# Patient Record
Sex: MALE | Employment: FULL TIME | URBAN - METROPOLITAN AREA
[De-identification: names, ages, dates, MRNs, and addresses within clinical notes are randomized per-mention and may not be internally consistent; named-entity substitution may affect disease eponyms.]

---

## 2018-05-04 ENCOUNTER — HOSPITAL ENCOUNTER (EMERGENCY)
Facility: CLINIC | Age: 68
Discharge: HOME | End: 2018-05-04
Attending: NURSE PRACTITIONER | Admitting: NURSE PRACTITIONER
Payer: OTHER MISCELLANEOUS

## 2018-05-04 VITALS
HEIGHT: 66 IN | DIASTOLIC BLOOD PRESSURE: 66 MMHG | RESPIRATION RATE: 16 BRPM | BODY MASS INDEX: 38.57 KG/M2 | TEMPERATURE: 97.7 F | SYSTOLIC BLOOD PRESSURE: 163 MMHG | OXYGEN SATURATION: 96 % | WEIGHT: 240 LBS

## 2018-05-04 DIAGNOSIS — H57.89 IRRITATION OF RIGHT EYE: ICD-10-CM

## 2018-05-04 DIAGNOSIS — S05.01XA ABRASION OF RIGHT CORNEA, INITIAL ENCOUNTER: ICD-10-CM

## 2018-05-04 PROCEDURE — 99283 EMERGENCY DEPT VISIT LOW MDM: CPT

## 2018-05-04 RX ORDER — MOXIFLOXACIN 5 MG/ML
1 SOLUTION/ DROPS OPHTHALMIC
Qty: 1 BOTTLE | Refills: 0 | Status: SHIPPED | OUTPATIENT
Start: 2018-05-04 | End: 2018-05-11

## 2018-05-04 ASSESSMENT — VISUAL ACUITY
OS: 20/40
OD: 20/50

## 2018-05-04 ASSESSMENT — ENCOUNTER SYMPTOMS
EYE PAIN: 1
EYE ITCHING: 1

## 2018-05-04 NOTE — ED AVS SNAPSHOT
Emergency Department    [address] [facility] [address]    Phone:  [phone]    Fax:  [phone]                                       Dillan Rodriguez   MRN: [id number]    Department:   Emergency Department   Date of Visit:  5/4/2018           Patient Information     Date Of Birth          1950        Your diagnoses for this visit were:     Irritation of right eye     Abrasion of right cornea, initial encounter        You were seen by Marisol Landrum APRN CNP.      Follow-up Information     Follow up with Fortunato Mckinley MD In 2 days.    Specialty:  Ophthalmology    Why:  Monday if symptoms continue    Contact information:    [facility]  [address] [facility] [address]  [facility] [address] [phone]          Discharge Instructions         Corneal Abrasion    You have received a scratch or scrape (abrasion) to your cornea. The cornea is the clear part in the front of the eye. This sensitive area is very painful when injured. You may make tears frequently, and your vision may be blurry until the injury heals. You may be sensitive to light.  This part of the body heals quickly. You can expect the pain to go away within 24 to 48 hours. If the abrasion is large or deep, your doctor may apply an eye patch, although this is not always done. An antibiotic ointment or eye drops may also be used to prevent infection.  Numbing drops may be used to relieve the pain temporarily so that your eyes can be examined. But these drops can t be prescribed for home use because that would prevent healing and lead to more serious problems. Also, if you can t feel your eye, there is a chance of accidentally injuring it further without knowing it.  Home care    A cold pack may be applied over the eye (or eye patch) for 20 minutes at a time, to reduce pain. To make a cold pack, put ice cubes in a plastic bag that seals at the top. Wrap the bag in a clean, thin towel or  cloth.    You may use acetaminophen or ibuprofen to control pain, unless another pain medicine was prescribed. Note: If you have chronic liver or kidney disease or have ever had a stomach ulcer or GI bleeding, talk with your doctor before using these medicines.    Rest your eyes and don t read until symptoms are gone.    If you use contact lenses, don t wear them until all symptoms are gone.    If your vision is affected by the corneal abrasion or if an eye patch was applied, don t drive a motor vehicle or operate machinery until all symptoms are gone. You may have trouble judging distances using only one eye.    If your eyes are sensitive to light, try wearing sunglasses, or stay indoors until symptoms go away.  Follow-up care  Follow up with your healthcare provider, or as advised.    If no patch was put on your eye and the pain continues for more than 48 hours, you should have another exam. Contact your healthcare provider to arrange this.    If your eye was patched and you were asked to remove the patch yourself, see your healthcare provider. Contact your healthcare provider if you still have pain after the patch is removed.    If you were given a return appointment for patch removal and re-examination, be sure to keep the appointment. Leaving the patch in place longer than advised could be harmful.  When to seek medical advice  Call your healthcare provider right away if any of these occur.    Increasing eye pain or pain that does not improve after 24 hours    Discharge from the eye    Increasing redness of the eye or swelling of the eyelids    Worsening vision    Symptoms get worse after the abrasion has healed  Date Last Reviewed: 7/1/2017 2000-2017 [employer]. [address] [place] [address], [place] [address]. All rights reserved. This information is not intended as a substitute for professional medical care. Always follow your healthcare professional's instructions.          24 Hour Appointment  Hotline       To make an appointment at any [facility], call [phone] ([phone]). If you don't have a family doctor or clinic, we will help you find one. [facility] are conveniently located to serve the needs of you and your family.             Review of your medicines      START taking        Dose / Directions Last dose taken    moxifloxacin 0.5 % ophthalmic solution   Commonly known as:  VIGAMOX   Dose:  1 drop   Quantity:  1 Bottle        Place 1 drop into the right eye every 4 hours (while awake) for 7 days   Refills:  0                Prescriptions were sent or printed at these locations (1 Prescription)                   Other Prescriptions                Printed at Department/Unit printer (1 of 1)         moxifloxacin (VIGAMOX) 0.5 % ophthalmic solution                Orders Needing Specimen Collection     None      Pending Results     No orders found from 5/2/2018 to 5/5/2018.            Pending Culture Results     No orders found from 5/2/2018 to 5/5/2018.            Pending Results Instructions     If you had any lab results that were not finalized at the time of your Discharge, you can call the ED Lab Result RN at [phone]. You will be contacted by this team for any positive Lab results or changes in treatment. The nurses are available 7 days a week from 10A to 6:30P.  You can leave a message 24 hours per day and they will return your call.        Test Results From Your Hospital Stay               Clinical Quality Measure: Blood Pressure Screening     Your blood pressure was checked while you were in the emergency department today. The last reading we obtained was  BP: 163/66 . Please read the guidelines below about what these numbers mean and what you should do about them.  If your systolic blood pressure (the top number) is less than 120 and your diastolic blood pressure (the bottom number) is less than 80, then your blood pressure is normal. There is nothing more that you  "need to do about it.  If your systolic blood pressure (the top number) is 120-139 or your diastolic blood pressure (the bottom number) is 80-89, your blood pressure may be higher than it should be. You should have your blood pressure rechecked within a year by a primary care provider.  If your systolic blood pressure (the top number) is 140 or greater or your diastolic blood pressure (the bottom number) is 90 or greater, you may have high blood pressure. High blood pressure is treatable, but if left untreated over time it can put you at risk for heart attack, stroke, or kidney failure. You should have your blood pressure rechecked by a primary care provider within the next 4 weeks.  If your provider in the emergency department today gave you specific instructions to follow-up with your doctor or provider even sooner than that, you should follow that instruction and not wait for up to 4 weeks for your follow-up visit.        Thank you for choosing [facility]       Thank you for choosing [facility] for your care. Our goal is always to provide you with excellent care. Hearing back from our patients is one way we can continue to improve our services. Please take a few minutes to complete the written survey that you may receive in the mail after you visit with us. Thank you!        [facility] Information     The [employer] lets you send messages to your doctor, view your test results, renew your prescriptions, schedule appointments and more. To sign up, go to [email or web address] . Click on \"Log in\" on the left side of the screen, which will take you to the Welcome page. Then click on \"Sign up Now\" on the right side of the page.     You will be asked to enter the access code listed below, as well as some personal information. Please follow the directions to create your username and password.     Your access code is: [email or web address]  Expires: 2018  7:02 PM     Your access code will  in 90 days. If you need help or a new code, " please call your [facility] clinic or [phone].        Care EveryWhere ID     This is your Care EveryWhere ID. This could be used by other organizations to access your [facility] medical records  [id number]        Equal Access to Services     [employer] : Sophia Reese, waaxda luqadaha, qaybta kaalmada radhatruemarge, radha adkins. So [facility] [phone].    ATENCIÓN: Si habla español, tiene a chow disposición servicios gratuitos de asistencia lingüística. Llame al [phone].    We comply with applicable federal civil rights laws and [place] laws. We do not discriminate on the basis of race, color, national origin, age, disability, sex, sexual orientation, or gender identity.            After Visit Summary       This is your record. Keep this with you and show to your community pharmacist(s) and doctor(s) at your next visit.

## 2018-05-04 NOTE — ED AVS SNAPSHOT
Emergency Department    [address] [place] [address]    Phone:  [phone]    Fax:  [phone]                                       Dillan Rodriguez   MRN: [id number]    Department:   Emergency Department   Date of Visit:  5/4/2018           After Visit Summary Signature Page     I have received my discharge instructions, and my questions have been answered. I have discussed any challenges I see with this plan with the nurse or doctor.    ..........................................................................................................................................  Patient/Patient Representative Signature      ..........................................................................................................................................  Patient Representative Print Name and Relationship to Patient    ..................................................               ................................................  Date                                            Time    ..........................................................................................................................................  Reviewed by Signature/Title    ...................................................              ..............................................  Date                                                            Time

## 2018-05-04 NOTE — LETTER
May 4, 2018      To Whom It May Concern:      Dillan Rodriguez was seen in our Emergency Department today, 05/04/18.  I expect his condition to improve over the next 4 days.  He may return to work/school when improved.    Sincerely,        Bret R. Dreyer, RN

## 2018-05-04 NOTE — ED PROVIDER NOTES
"  History     Chief Complaint:  Right eye blurriness    HPI   Dillan Rodriguez is a 67 year old male who presents for evaluation of blurry vision in his right eye. The patient states he was working four days ago and some \"Fabul[employer]\" cleaning solution splashed up. He was wearing glasses, but some of the chemical got into his eye. He did not feel any symptoms at that time and therefore did not flush his eye. He complains that the vision in that eye has been blurry since. He also complains of some mild pain and iiritation. He went to the doctor two days ago and was told his eye looked fine. He was prescribed Erythromycin ointment, which he has used once yesterday and once today (however instructions advised 4X daily).    Allergies:  No known drug allergies    Medications:    The patient is not currently taking any prescribed medications.    Past Medical History:    The patient denies any significant past medical history.    Past Surgical History:    The patient does not have any pertinent past surgical history.    Family History:    No past pertinent family history.    Social History:  The patient was in the ED alone.    Review of Systems   Eyes: Positive for pain, itching and visual disturbance.   All other systems reviewed and are negative.    Physical Exam   First Vitals:  BP: 163/66  Heart Rate: 79  Temp: 97.7  F (36.5  C)  Resp: 16  Height: 167.6 cm (5' 6\")  Weight: 108.9 kg (240 lb)  SpO2: 96 %  Visual Acuity-Left: 20/40  Visual Acuity-Right: 20/50    Physical Exam  Physical Exam   Constitutional:  Male sitting upright. Non toxic appearing. Appears comfortable.  Head: Atraumatic.  Head moves freely with normal range of motion.   Eyes: No obvious injection of the right eye. No periorbital erythema or edema. Occular pressure 17 in the right and 16 on the left. Slit lamp exam with no foreign body noted. No flare in the anterior chamber. Fluorescin uptake in a peppered appearance at 9 o'clock over the iris. He has a " crescent shaped brown stain at 5 o'clock, which he has had since birth (over the sclera). Conjunctivae pink. EOMs intact.   Neck: Normal range of motion.   Cardiovascular: Intact distal pulses:   Pulmonary/Chest: No respiratory distress.   Musculoskeletal: Moves all extremities.   Neurological: Oriented to person, place, and time. No focal deficits.   Skin: Skin is warm with no erythema or heat to touch.      Emergency Department Course     Emergency Department Course:  Nursing notes and vitals reviewed.  I performed an exam of the patient as documented above.     1848 I measure the patient's pressure at 17 in his right eye and 16 in his left eye.    1854 I spoke to Poison Control. Given initial exposure was 3 days ago, no need for pH testing. They agreed with the plan made here today.     Findings and plan explained to the patient. Patient discharged home with instructions regarding supportive care, medications, and reasons to return. The importance of close follow-up was reviewed. The patient was prescribed Vigamox ophthalmic solution.    Impression & Plan      Medical Decision Making:  Dillan Rodriguez is a 67 year old male right eye irritation post chemical exposure 3 days ago. He was seen 2 days ago for this and given erythromycin optic ointment but has been using this only once a day. Visual acuity with no concerns. Eye pressures normal. Slit lamp exam consistent with a resolving corneal abrasion. No concerns for corneal ulceration, full thickness injury or iritis on slit lamp exam. We discussed using eye drops, Vigamox and follow up with Ophthalmology Monday (2 days) if no improvement. We also reviewed reasons to return here.     Diagnosis:    ICD-10-CM    1. Irritation of right eye H57.8    2. Abrasion of right cornea, initial encounter S05.01XA        Disposition:  The patient was discharged home.    Discharge Medications:  New Prescriptions    MOXIFLOXACIN (VIGAMOX) 0.5 % OPHTHALMIC SOLUTION    Place 1  drop into the right eye every 4 hours (while awake) for 7 days       5/4/2018    EMERGENCY DEPARTMENT    I, Asha Arana, am serving as a scribe at 1833 on May 4, 2018  to document services personally performed by Marisol Landrum NP, based on my observations and the provider's statements to me.       Marisol Landrum, MARY CNP  05/04/18 7224

## 2018-05-05 NOTE — DISCHARGE INSTRUCTIONS
Corneal Abrasion    You have received a scratch or scrape (abrasion) to your cornea. The cornea is the clear part in the front of the eye. This sensitive area is very painful when injured. You may make tears frequently, and your vision may be blurry until the injury heals. You may be sensitive to light.  This part of the body heals quickly. You can expect the pain to go away within 24 to 48 hours. If the abrasion is large or deep, your doctor may apply an eye patch, although this is not always done. An antibiotic ointment or eye drops may also be used to prevent infection.  Numbing drops may be used to relieve the pain temporarily so that your eyes can be examined. But these drops can t be prescribed for home use because that would prevent healing and lead to more serious problems. Also, if you can t feel your eye, there is a chance of accidentally injuring it further without knowing it.  Home care    A cold pack may be applied over the eye (or eye patch) for 20 minutes at a time, to reduce pain. To make a cold pack, put ice cubes in a plastic bag that seals at the top. Wrap the bag in a clean, thin towel or cloth.    You may use acetaminophen or ibuprofen to control pain, unless another pain medicine was prescribed. Note: If you have chronic liver or kidney disease or have ever had a stomach ulcer or GI bleeding, talk with your doctor before using these medicines.    Rest your eyes and don t read until symptoms are gone.    If you use contact lenses, don t wear them until all symptoms are gone.    If your vision is affected by the corneal abrasion or if an eye patch was applied, don t drive a motor vehicle or operate machinery until all symptoms are gone. You may have trouble judging distances using only one eye.    If your eyes are sensitive to light, try wearing sunglasses, or stay indoors until symptoms go away.  Follow-up care  Follow up with your healthcare provider, or as advised.    If no patch was put on  your eye and the pain continues for more than 48 hours, you should have another exam. Contact your healthcare provider to arrange this.    If your eye was patched and you were asked to remove the patch yourself, see your healthcare provider. Contact your healthcare provider if you still have pain after the patch is removed.    If you were given a return appointment for patch removal and re-examination, be sure to keep the appointment. Leaving the patch in place longer than advised could be harmful.  When to seek medical advice  Call your healthcare provider right away if any of these occur.    Increasing eye pain or pain that does not improve after 24 hours    Discharge from the eye    Increasing redness of the eye or swelling of the eyelids    Worsening vision    Symptoms get worse after the abrasion has healed  Date Last Reviewed: 7/1/2017 2000-2017 [employer]. [address] [place] [address], [place] [address]. All rights reserved. This information is not intended as a substitute for professional medical care. Always follow your healthcare professional's instructions.

## 2019-12-31 ENCOUNTER — RECORDS - HEALTHEAST (OUTPATIENT)
Dept: ADMINISTRATIVE | Facility: OTHER | Age: 69
End: 2019-12-31

## 2020-01-21 ENCOUNTER — COMMUNICATION - HEALTHEAST (OUTPATIENT)
Dept: PALLIATIVE MEDICINE | Facility: OTHER | Age: 70
End: 2020-01-21

## 2021-08-27 ENCOUNTER — OFFICE VISIT (OUTPATIENT)
Dept: NEUROLOGY | Facility: CLINIC | Age: 71
End: 2021-08-27
Payer: OTHER MISCELLANEOUS

## 2021-08-27 VITALS
BODY MASS INDEX: 40.21 KG/M2 | SYSTOLIC BLOOD PRESSURE: 128 MMHG | HEIGHT: 66 IN | HEART RATE: 63 BPM | DIASTOLIC BLOOD PRESSURE: 64 MMHG | WEIGHT: 250.2 LBS

## 2021-08-27 DIAGNOSIS — G43.109 MIGRAINE WITH VERTIGO: Primary | ICD-10-CM

## 2021-08-27 DIAGNOSIS — G43.109 MIGRAINE WITH VERTIGO: ICD-10-CM

## 2021-08-27 PROBLEM — G43.719 INTRACTABLE CHRONIC MIGRAINE WITHOUT AURA AND WITHOUT STATUS MIGRAINOSUS: Status: ACTIVE | Noted: 2017-06-23

## 2021-08-27 PROBLEM — R42 DIZZINESS: Status: ACTIVE | Noted: 2017-03-23

## 2021-08-27 PROCEDURE — 99215 OFFICE O/P EST HI 40 MIN: CPT | Performed by: PSYCHIATRY & NEUROLOGY

## 2021-08-27 RX ORDER — INSULIN GLARGINE 100 [IU]/ML
INJECTION, SOLUTION SUBCUTANEOUS
COMMUNITY
Start: 2021-08-03

## 2021-08-27 RX ORDER — RIZATRIPTAN BENZOATE 10 MG/1
10 TABLET ORAL
Qty: 18 TABLET | Refills: 1 | Status: SHIPPED | OUTPATIENT
Start: 2021-08-27

## 2021-08-27 RX ORDER — TOPIRAMATE 25 MG/1
TABLET, FILM COATED ORAL
Qty: 120 TABLET | Refills: 1 | Status: SHIPPED | OUTPATIENT
Start: 2021-08-27

## 2021-08-27 RX ORDER — ATORVASTATIN CALCIUM 20 MG/1
20 TABLET, FILM COATED ORAL DAILY
COMMUNITY
Start: 2021-07-02

## 2021-08-27 RX ORDER — ENALAPRIL MALEATE 10 MG/1
10 TABLET ORAL DAILY
COMMUNITY
Start: 2021-07-05

## 2021-08-27 RX ORDER — MIRABEGRON 25 MG/1
25 TABLET, FILM COATED, EXTENDED RELEASE ORAL DAILY
COMMUNITY
Start: 2021-07-06

## 2021-08-27 RX ORDER — ALFUZOSIN HYDROCHLORIDE 10 MG/1
1 TABLET, EXTENDED RELEASE ORAL DAILY
COMMUNITY
Start: 2021-07-02

## 2021-08-27 RX ORDER — CYCLOSPORINE 0.5 MG/ML
EMULSION OPHTHALMIC 2 TIMES DAILY
COMMUNITY
Start: 2021-07-02

## 2021-08-27 RX ORDER — METFORMIN HCL 500 MG
1000 TABLET, EXTENDED RELEASE 24 HR ORAL 2 TIMES DAILY
COMMUNITY
Start: 2021-03-02

## 2021-08-27 RX ORDER — BLOOD SUGAR DIAGNOSTIC
STRIP MISCELLANEOUS
COMMUNITY
Start: 2021-07-10

## 2021-08-27 ASSESSMENT — MIFFLIN-ST. JEOR: SCORE: 1822.65
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
## 2021-08-27 NOTE — NURSING NOTE
Chief Complaint   Patient presents with     Headache     Ada Sánchez RN on 8/27/2021 at 11:31 AM
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
## 2021-08-31 ENCOUNTER — TELEPHONE (OUTPATIENT)
Dept: NEUROLOGY | Facility: CLINIC | Age: 71
End: 2021-08-31

## 2021-08-31 NOTE — TELEPHONE ENCOUNTER
Central Prior Authorization Team   Phone: [phone]      PA Initiation    Medication: Rizatriptan 10 mg   Insurance Company: [employer] - Phone [phone] Fax [phone]  Pharmacy Filling the Rx: [facility] #[id number] - [place], [place] - [address] AT [facility] [address]  Filling Pharmacy Phone:    Filling Pharmacy Fax:    Start Date: 8/31/2021

## 2021-08-31 NOTE — TELEPHONE ENCOUNTER
Prior Authorization Specialty Medication Request    Medication/Dose:   ICD code (if different than what is on RX):    Previously Tried and Failed:      Important Lab Values:   Rationale:     Insurance Name:   Insurance ID:   Insurance Phone Number:     Pharmacy Information (if different than what is on RX)  Name:    Phone:

## 2021-08-31 NOTE — TELEPHONE ENCOUNTER
Prior Authorization Approval    Authorization Effective Date: 1/1/2021  Authorization Expiration Date: 12/31/2021  Medication: Rizatriptan 10 mg -Approved   Approved Dose/Quantity: 18 TABLETS PER 9 DAYS  Reference #: CASE ID [id number]   Insurance Company: [employer] - Phone [phone] Fax [phone]  Expected CoPay:       CoPay Card Available: No    Foundation Assistance Needed:    Which Pharmacy is filling the prescription (Not needed for infusion/clinic administered): [facility] #[id number] - [address] AT [facility]  Pharmacy Notified: Yes  Patient Notified: Yes

## 2021-08-31 NOTE — TELEPHONE ENCOUNTER
Central Prior Authorization Team   Phone: [phone]      PA Initiation    Medication: Topiramate 25 mg  Insurance Company: [employer] - Phone [phone] Fax [phone]  Pharmacy Filling the Rx: [facility] #[id number] - [place], [place] - [address] AT [facility] [address]  Filling Pharmacy Phone: [phone]  Filling Pharmacy Fax: [phone]  Start Date: 8/31/2021

## 2021-08-31 NOTE — TELEPHONE ENCOUNTER
Prior Authorization Approval    Authorization Effective Date: 1/1/2021  Authorization Expiration Date: 12/31/2021  Medication: Topiramate 25 mg - Approved   Approved Dose/Quantity: 360 tablets per 90 days  Reference #: case id [id number]   Insurance Company: [email or web address] - Phone [phone] Fax [phone]  Expected CoPay:       CoPay Card Available: No    Foundation Assistance Needed:    Which Pharmacy is filling the prescription (Not needed for infusion/clinic administered): [facility] #[id number] - [place], [place] - [facility] [address] AT [facility]  Pharmacy Notified: Yes  Patient Notified: Yes